# Patient Record
Sex: MALE | Race: WHITE | Employment: FULL TIME | URBAN - METROPOLITAN AREA
[De-identification: names, ages, dates, MRNs, and addresses within clinical notes are randomized per-mention and may not be internally consistent; named-entity substitution may affect disease eponyms.]

---

## 2022-04-03 ENCOUNTER — APPOINTMENT (OUTPATIENT)
Dept: GENERAL RADIOLOGY | Age: 22
End: 2022-04-03
Payer: COMMERCIAL

## 2022-04-03 ENCOUNTER — HOSPITAL ENCOUNTER (EMERGENCY)
Age: 22
Discharge: HOME | End: 2022-04-03
Attending: EMERGENCY MEDICINE
Payer: COMMERCIAL

## 2022-04-03 VITALS
OXYGEN SATURATION: 99 % | WEIGHT: 260 LBS | DIASTOLIC BLOOD PRESSURE: 84 MMHG | BODY MASS INDEX: 35.21 KG/M2 | SYSTOLIC BLOOD PRESSURE: 137 MMHG | RESPIRATION RATE: 15 BRPM | TEMPERATURE: 99.2 F | HEART RATE: 97 BPM | HEIGHT: 72 IN

## 2022-04-03 DIAGNOSIS — S93.402A SPRAIN OF LEFT ANKLE, UNSPECIFIED LIGAMENT, INITIAL ENCOUNTER: Primary | ICD-10-CM

## 2022-04-03 PROCEDURE — 73610 X-RAY EXAM OF ANKLE: CPT

## 2022-04-03 PROCEDURE — 99283 EMERGENCY DEPT VISIT LOW MDM: CPT

## 2022-04-03 RX ORDER — NAPROXEN 500 MG/1
500 TABLET ORAL 2 TIMES DAILY PRN
Qty: 20 TABLET | Refills: 0 | Status: SHIPPED | OUTPATIENT
Start: 2022-04-03 | End: 2022-04-10

## 2022-04-03 ASSESSMENT — PAIN - FUNCTIONAL ASSESSMENT: PAIN_FUNCTIONAL_ASSESSMENT: 0-10

## 2022-04-03 ASSESSMENT — PAIN SCALES - GENERAL: PAINLEVEL_OUTOF10: 3

## 2022-04-03 NOTE — Clinical Note
Claudia Cantor was seen and treated in our emergency department on 4/3/2022. He may return to work on 04/05/2022. If you have any questions or concerns, please don't hesitate to call.       Sole Johnson MD

## 2022-04-03 NOTE — ED NOTES
Discharge instructions and prescription information was given to the patient who expressed understanding of information and follow up care.       Merlinda Moh, RN  04/03/22 2157

## 2022-04-03 NOTE — ED PROVIDER NOTES
Emergency Department Encounter  [address]    Patient: Yohan Orourke  MRN: [id number]  : 2000  Date of Evaluation: 4/3/2022  ED Provider: Keyona Morris MD    Chief Complaint       Chief Complaint   Patient presents with    Ankle Pain     [address] is a 24 y.o. male who presents to the emergency department for evaluation of left ankle pain. Patient reports being in usual state of health until yesterday. He was with his he was playing softball. He said he just pivoted while standing up and his left ankle rolled underneath him laterally. Patient denied any other injury denied hearing a pop or snap. He has been ambulatory since the incident occurred. He did rest ice and elevate and used ibuprofen last night and has had improvement in symptoms since that time. Patient denies any other injury. Specifically denies foot pain knee pain or hip pain. ROS:     At least 10 systems reviewed and otherwise acutely negative except as in the [address] Past History   History reviewed. No pertinent past medical history. History reviewed. No pertinent surgical history.   Social History     Socioeconomic History    Marital status: Single     Spouse name: None    Number of children: None    Years of education: None    Highest education level: None   Occupational History    None   Tobacco Use    Smoking status: Never Smoker    Smokeless tobacco: Never Used   Vaping Use    Vaping Use: Never used   Substance and Sexual Activity    Alcohol use: Never    Drug use: Never    Sexual activity: None   Other Topics Concern    None   Social History Narrative    None     Social Determinants of Health     Financial Resource Strain:     Difficulty of Paying Living Expenses: Not on file   Food Insecurity:     Worried About Running Out of Food in the Last Year: Not on file    Luis of Food in the Last Year: Not on file   Transportation Needs:     Lack of Transportation (Medical): Not on file    Lack of Transportation (Non-Medical): Not on file   Physical Activity:     Days of Exercise per Week: Not on file    Minutes of Exercise per Session: Not on file   Stress:     Feeling of Stress : Not on file   Social Connections:     Frequency of Communication with Friends and Family: Not on file    Frequency of Social Gatherings with Friends and Family: Not on file    Attends Worship Services: Not on file    Active Member of [address] [place] [address] [employer] or Organizations: Not on file    Attends Club or Organization Meetings: Not on file    Marital Status: Not on file   Intimate Partner Violence:     Fear of Current or Ex-Partner: Not on file    Emotionally Abused: Not on file    Physically Abused: Not on file    Sexually Abused: Not on file   Housing Stability:     Unable to Pay for Housing in the Last Year: Not on file    Number of [place] in the Last Year: Not on file    Unstable Housing in the Last Year: Not on file       Medications/Allergies     Previous Medications    No medications on file     No Known Allergies     Physical Exam       ED Triage Vitals [04/03/22 1658]   BP Temp Temp Source Pulse Resp SpO2 Height Weight   137/84 99.2 °F (37.3 °C) Oral 97 15 99 % 6' (1.829 m) 260 lb (117.9 kg)     GENERAL APPEARANCE: Awake and alert. Cooperative. No acute distress. HEAD: Normocephalic. Atraumatic. EYES: Sclera anicteric. Pupils equal round reactive to light extraocular movements are intact  ENT: Tolerates saliva. No trismus. Moist mucous membranes  NECK: Supple. Trachea midline. No meningismus  CARDIO: RRR. Radial pulse 2+. No murmurs rubs or gallops appreciated  LUNGS: Respirations unlabored. CTAB. No accessory muscle usage noted. No wheezes rales rhonchi or stridor. ABDOMEN: Soft. Non-distended. Non-tender. No tenderness in right upper quadrant or right lower quadrant to deep palpation  EXTREMITIES: No acute deformities.   No unilateral leg swelling or tenderness behind either one of calves. In particular examination of patient's left lower leg no tenderness over the fibular head or tibial plateau no tenderness behind the patient's left calf. Patient Achilles tendon is intact in flexion-extension rotation. No tenderness over the first or fifth metatarsal palpable DP and PT pulses intact sensation light touch capillary refill is less than 3 seconds. No tenderness over the medial or lateral malleolus of the ankle. No tenderness of the distal fibula. Mild edema when palpating over the lateral aspects of the ankle  SKIN: Warm and dry. No erythema edema or rashes appreciated  NEUROLOGICAL:  Cranial nerves II through XII grossly intact. No gross facial drooping. Moves all 4 extremities spontaneously. PSYCHIATRIC: Normal mood. Alert and oriented x3. No reported active suicidality or homicidality. Diagnostics   Labs:  No results found for this visit on 04/03/22. Radiographs:  XR ANKLE LEFT (MIN 3 VIEWS)    Result Date: 4/3/2022  EXAMINATION: THREE XRAY VIEWS OF THE LEFT ANKLE 4/3/2022 5:17 pm COMPARISON: None. HISTORY: ORDERING SYSTEM PROVIDED HISTORY: pain, swelling and bruising, rolled ankle during softball practice yesterday TECHNOLOGIST PROVIDED HISTORY: Reason for exam:->pain, swelling and bruising, rolled ankle during softball practice yesterday Reason for Exam: pain, swelling and bruising, rolled ankle during softball practice yesterday Additional signs and symptoms: left lateral ankle pain FINDINGS: No acute fracture or malalignment. No significant degenerative changes. Soft tissues unremarkable. 1. No acute fracture or malalignment. Procedures/EKG:       ED Course and MDM   In brief, Agata Laird is a 24 y.o. male who presented to the emergency department for evaluation of left ankle pain. Based on patient's history and physical to see most consistent with a anterior or posterior talofibular ligamentous sprain.   Patient has been ambulatory without difficulty. Lower clinical suspicion for distal fibular fractures. He is neurovascular intact distal to the point of injury. I did review patient's imaging studies as noted above. I do not believe the patient needs any laboratory work. I discussed the findings with the patient. Does seem most consistent with a anterior posterior talofibular ligamentous type injury. Lower clinical suspicion for the deltoid ligament given the stability of the joint. He is neurovascular intact. Recommend the use of a lace up type ankle support for playing sports while he is in the healing process fully for the next 4 to 6 weeks. Recommend close follow-up with primary care physician or sports medicine physician in the next 24 to 48 hours. Return to the emergency department for increasing pain, repeat injury in the next 2 weeks, numbness tingling coldness in his left foot or any other concerning symptoms he did express a verbal understanding of these instructions and does feel comfortable to be discharged home    ED Medication Orders (From admission, onward)    None          Final Impression      1. Sprain of left ankle, unspecified ligament, initial encounter      DISPOSITION           This patient was cared for in the setting of the COVID-19 pandemic, with nationwide stress on resources and staffing.     (Please note that portions of this note may have been completed with a voice recognition program. Efforts were made to edit the dictations but occasionally words are mis-transcribed.)    Carina Lux MD  [address] [facility]        Carina Lux MD  04/03/22 4341

## 2022-04-03 NOTE — ED TRIAGE NOTES
Pt arrives with complaints of left ankle pain that occurred after he states he rolled his ankle yesterday at softball practice. He iced and elevated his ankle and took 800 mg of ibuprofen last night at 10 pm, he is ambulatory at triage. He noticed today that the swelling and bruising is getting worse.